# Patient Record
Sex: MALE | Race: WHITE
[De-identification: names, ages, dates, MRNs, and addresses within clinical notes are randomized per-mention and may not be internally consistent; named-entity substitution may affect disease eponyms.]

---
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
## 2020-08-20 ENCOUNTER — HOSPITAL ENCOUNTER (OUTPATIENT)
Dept: HOSPITAL 50 - VM.SDS | Age: 60
Discharge: HOME | End: 2020-08-20
Attending: FAMILY MEDICINE
Payer: MEDICAID

## 2020-08-20 VITALS — SYSTOLIC BLOOD PRESSURE: 134 MMHG | HEART RATE: 55 BPM | DIASTOLIC BLOOD PRESSURE: 70 MMHG

## 2020-08-20 DIAGNOSIS — I78.1: ICD-10-CM

## 2020-08-20 DIAGNOSIS — F17.210: ICD-10-CM

## 2020-08-20 DIAGNOSIS — R05: ICD-10-CM

## 2020-08-20 DIAGNOSIS — K29.60: ICD-10-CM

## 2020-08-20 DIAGNOSIS — E78.2: ICD-10-CM

## 2020-08-20 DIAGNOSIS — K57.30: ICD-10-CM

## 2020-08-20 DIAGNOSIS — I10: ICD-10-CM

## 2020-08-20 DIAGNOSIS — Z80.0: ICD-10-CM

## 2020-08-20 DIAGNOSIS — Z20.828: ICD-10-CM

## 2020-08-20 DIAGNOSIS — J44.9: ICD-10-CM

## 2020-08-20 DIAGNOSIS — Z79.899: ICD-10-CM

## 2020-08-20 DIAGNOSIS — K52.9: ICD-10-CM

## 2020-08-20 DIAGNOSIS — K44.9: ICD-10-CM

## 2020-08-20 DIAGNOSIS — Z01.812: ICD-10-CM

## 2020-08-20 DIAGNOSIS — K64.9: ICD-10-CM

## 2020-08-20 DIAGNOSIS — D12.6: Primary | ICD-10-CM

## 2020-08-20 PROCEDURE — 87635 SARS-COV-2 COVID-19 AMP PRB: CPT

## 2020-08-20 PROCEDURE — 45380 COLONOSCOPY AND BIOPSY: CPT

## 2020-08-20 PROCEDURE — 43239 EGD BIOPSY SINGLE/MULTIPLE: CPT

## 2020-08-20 PROCEDURE — 45385 COLONOSCOPY W/LESION REMOVAL: CPT

## 2020-08-20 PROCEDURE — U0002 COVID-19 LAB TEST NON-CDC: HCPCS

## 2020-08-21 NOTE — OR
PRE-OPERATIVE DIAGNOSES:

1. History of chronic diarrhea for the past couple of years.  He does note

    occasional blood in the stool.

2. Positive family history of colon cancer in mother when she was in her 70s.

 

POST-OPERATIVE DIAGNOSES:

1. Total of 9 polyps removed.

    a.     3 mm polyp at 85 cm, removed with cold forceps.

    b.     3 mm polyp at 80 cm, removed with cold forceps.

    c.     2 mm x2 at 75 cm, removed with cold forceps.

    d.     4 mm x2 at 60 cm, removed with hot snare.

    e.     2 mm at 25 cm, removed with cold forceps.

    f.     9 mm and 4 mm polyps at 20 cm, both removed using hot snare.

2. Minimal left-sided diverticulosis.

3. Mild hemorrhoids.

4. Normal-appearing colonic mucosa and normal-appearing distal ileum.  Random

    biopsies were taken from distal ileum and then randomly throughout the

    colon.

 

PROCEDURE:  Colonoscopy with polypectomy x9 (4 hot snares and 5 cold forceps).

Random biopsies also taken from the distal ileum and then randomly throughout

each segment of colon including the rectum.

 

SURGEON:  Hector Romero M.D.

 

ANESTHESIA:  Monitored anesthesia care.

 

BOWEL PREP:  Good.

 

DESCRIPTION OF PROCEDURE:  Everton is a 60-year-old male who was brought to the

endoscopy suite after discussing risks and benefits of the procedure.  Informed

consent was obtained for conscious sedation and colonoscopy with or without

biopsy and/or polypectomy.  We also discussed possibility of missed lesions.

Pre-procedure exam was unremarkable.  IV, oxygen, and monitors were placed.  The

patient was placed in the left lateral decubitus position.  Sedation was

administered and a digital rectal exam was performed and unremarkable except for

some mild hemorrhoids.  The colonoscope was passed in the rectum and slowly

advanced all the way to the cecum.  Cecum was viewed and photographed.

Ileocecal valve was intubated and distal ileum was normal in appearance.  Cold

biopsy x2 bites taken from the distal ileum.  Colonoscope was slowly withdrawn,

mucosa closely observed in direct circumferential manner.  Random colon biopsies

were taken from each segment of the colon given the patient's chronic diarrhea.

The patient also had numerous polyps, total of 9 as noted above.  The ascending

colon was otherwise unremarkable.  Transverse colon unremarkable.  The

descending and sigmoid colon revealed some minimal diverticulosis.  Retroflexion

was performed.  Rectal mucosa revealed some mild hemorrhoids which were mildly

inflamed.  Scope was removed.  The patient tolerated the procedure well.  The

patient was monitored until that baseline status.  Discharge instructions were

reviewed and the patient was discharged in good condition.

 

COMPLICATIONS:  None.

 

TOTAL TIME:  35 minutes.

 

ESTIMATED BLOOD LOSS:  1 to 2 mL.

 

RECOMMENDATIONS/FOLLOW-UP:  We will await results of path report to determine

ideal followup interval.

 

I would like to kindly thank Kal Luther for this referral.

 

 

DMB:  08/20/2020 14:16:34  MODL:  08/20/2020 16:09:33

Job #:  [id number]

## 2020-08-21 NOTE — OR
PRE-OPERATIVE DIAGNOSES:

1. Dysphagia for the past year.

2. Chronic diarrhea.

 

POST-OPERATIVE DIAGNOSES:

1. Moderate antritis.  Antral biopsy x2 bites taken.

2. 1 cm sliding-type hiatal hernia noted without any significant esophagitis.

3. Normal-appearing duodenum.  Cold biopsy x2 bites taken given his history of

    chronic diarrhea.

 

PROCEDURE:  Esophagogastroduodenoscopy with cold biopsy x2 sites (antrum and

duodenum).

 

SURGEON:  Hector Romero M.D.

 

ANESTHESIA:  Monitored anesthesia care.

 

DESCRIPTION OF PROCEDURE:  Everton is a 60-year-old male who was brought to the

endoscope suite after discussion of risks and benefits (including but not

limited to reaction to medication, bleeding, infection, aspiration,

perforation).  Informed consent was obtained for monitored anesthesia care and

esophagogastroduodenoscopy along with possible biopsy and/or dilatation.

 

Pre-procedure exam including oral cavity was unremarkable except for poor

dentition.  IV, oxygen, and monitors were placed.  Patient was placed in the

left lateral position and sedation was administered.  A bite block was placed

gently and scope lightly lubricated and passed through the bite block and over

the tongue.  Hypopharynx and vocal cords were visualized and unremarkable.

There does appear to be some chronic mild inflammation likely from reflux and

smoking.  Scope was passed through the cricopharynx and into the esophagus.  The

scope was then passed through the distal esophagus and the GE junction was

visualized and photographed.  The GE junction was remarkable for a small 1 cm

sliding-type hiatal hernia.  There was no evidence for any significant

esophagitis.  Vocal cords were visualized and unremarkable.  The scope was

advanced into the stomach and gastric lake was suctioned.  Pylorus was

identified and intubated and then the scope was advanced to the third portion of

the duodenum.  The second and third portions of the duodenum were unremarkable.

Cold biopsy x2 bites taken given his history of chronic diarrhea.  Duodenal bulb

was visualized and unremarkable.  The scope was brought back into the stomach.

The pylorus and the antrum revealed some moderate antritis with red streaking.

Biopsies for H. pylori and path were obtained from the antrum using cold forceps

x2 bites.  The scope was then retroflexed to visualize the angularis, fundus,

body, and cardia.  These were unremarkable.

 

The stomach was desufflated of air and then the scope was slowly withdrawn, and

the esophagus was closely visualized during withdrawal all the way into the

posterior pharynx and this was normal.  The patient tolerated the procedure well

and went to recovery in stable condition.  The patient was monitored until at

baseline status.  Findings and discharge instructions were reviewed and the

patient was discharged in good condition.

 

COMPLICATIONS:  None.

 

TOTAL TIME:  7 minutes.

 

ESTIMATED BLOOD LOSS:  About 1 mL.

 

RECOMMENDATIONS/FOLLOW-UP:  I did recommend increasing his omeprazole to 40 mg

daily.  Also recommended avoidance of NSAIDs and smoking cessation.  He would

also benefit from limiting or avoiding alcohol intake.  Prescription was done

for the omeprazole 40 mg daily.  We will send letter with path results.

 

I would like to kindly thank Kal Luther for this referral.

 

 

DMB:  08/20/2020 14:11:50  MODL:  08/20/2020 15:57:15

Job #:  [id number]

## 2020-10-08 ENCOUNTER — HOSPITAL ENCOUNTER (INPATIENT)
Dept: HOSPITAL 52 - LL.ED | Age: 60
LOS: 1 days | Discharge: HOME | DRG: 641 | End: 2020-10-09
Attending: FAMILY MEDICINE | Admitting: FAMILY MEDICINE
Payer: MEDICAID

## 2020-10-08 DIAGNOSIS — R63.4: ICD-10-CM

## 2020-10-08 DIAGNOSIS — I95.89: ICD-10-CM

## 2020-10-08 DIAGNOSIS — M19.90: ICD-10-CM

## 2020-10-08 DIAGNOSIS — D64.9: ICD-10-CM

## 2020-10-08 DIAGNOSIS — F17.210: ICD-10-CM

## 2020-10-08 DIAGNOSIS — I25.10: ICD-10-CM

## 2020-10-08 DIAGNOSIS — F32.9: ICD-10-CM

## 2020-10-08 DIAGNOSIS — Z71.6: ICD-10-CM

## 2020-10-08 DIAGNOSIS — F41.8: ICD-10-CM

## 2020-10-08 DIAGNOSIS — F41.9: ICD-10-CM

## 2020-10-08 DIAGNOSIS — R19.7: ICD-10-CM

## 2020-10-08 DIAGNOSIS — I10: ICD-10-CM

## 2020-10-08 DIAGNOSIS — I71.4: ICD-10-CM

## 2020-10-08 DIAGNOSIS — E87.1: ICD-10-CM

## 2020-10-08 DIAGNOSIS — N28.9: ICD-10-CM

## 2020-10-08 DIAGNOSIS — N17.9: ICD-10-CM

## 2020-10-08 DIAGNOSIS — G89.29: ICD-10-CM

## 2020-10-08 DIAGNOSIS — K21.9: ICD-10-CM

## 2020-10-08 DIAGNOSIS — Z79.899: ICD-10-CM

## 2020-10-08 DIAGNOSIS — E78.5: ICD-10-CM

## 2020-10-08 DIAGNOSIS — E03.9: ICD-10-CM

## 2020-10-08 DIAGNOSIS — Z98.890: ICD-10-CM

## 2020-10-08 DIAGNOSIS — I71.9: ICD-10-CM

## 2020-10-08 DIAGNOSIS — E86.0: Primary | ICD-10-CM

## 2020-10-08 DIAGNOSIS — J43.1: ICD-10-CM

## 2020-10-08 DIAGNOSIS — M54.2: ICD-10-CM

## 2020-10-08 DIAGNOSIS — M54.9: ICD-10-CM

## 2020-10-08 LAB — APTT PPP: 39.8 SEC (ref 24.5–32.8)

## 2020-10-08 PROCEDURE — C9113 INJ PANTOPRAZOLE SODIUM, VIA: HCPCS

## 2020-10-08 PROCEDURE — 85730 THROMBOPLASTIN TIME PARTIAL: CPT

## 2020-10-08 PROCEDURE — 96374 THER/PROPH/DIAG INJ IV PUSH: CPT

## 2020-10-08 PROCEDURE — 71045 X-RAY EXAM CHEST 1 VIEW: CPT

## 2020-10-08 PROCEDURE — 99285 EMERGENCY DEPT VISIT HI MDM: CPT

## 2020-10-08 PROCEDURE — 83605 ASSAY OF LACTIC ACID: CPT

## 2020-10-08 PROCEDURE — 82553 CREATINE MB FRACTION: CPT

## 2020-10-08 PROCEDURE — 83735 ASSAY OF MAGNESIUM: CPT

## 2020-10-08 PROCEDURE — 85610 PROTHROMBIN TIME: CPT

## 2020-10-08 PROCEDURE — 80307 DRUG TEST PRSMV CHEM ANLYZR: CPT

## 2020-10-08 PROCEDURE — 96361 HYDRATE IV INFUSION ADD-ON: CPT

## 2020-10-08 PROCEDURE — 84550 ASSAY OF BLOOD/URIC ACID: CPT

## 2020-10-08 PROCEDURE — 93005 ELECTROCARDIOGRAM TRACING: CPT

## 2020-10-08 PROCEDURE — 82550 ASSAY OF CK (CPK): CPT

## 2020-10-08 PROCEDURE — 83880 ASSAY OF NATRIURETIC PEPTIDE: CPT

## 2020-10-08 PROCEDURE — 84484 ASSAY OF TROPONIN QUANT: CPT

## 2020-10-08 PROCEDURE — 84443 ASSAY THYROID STIM HORMONE: CPT

## 2020-10-08 PROCEDURE — 85379 FIBRIN DEGRADATION QUANT: CPT

## 2020-10-08 NOTE — EDM.PDOC
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   


Qualifiers: 


   Hyperlipidemia type: unspecified   Qualified Code(s): E78.5 - Hyperlipidemia,

 unspecified   





(8) Hypotension


SNOMED Code(s): 57347000


   Code(s): I95.9 - HYPOTENSION, UNSPECIFIED   Status: Acute   Priority: High   

Current Visit: No   Onset Date: 10/08/20   Annotation/Comment:: Blood pressures 

improved after 1 L bolus of normal saline with symptoms likely secondary to 

dehydration from his chronic diarrhea as above/below.  Continue aggressive IV 

fluid hydration.  Note hyponatremia.  Consider 3% sodium chloride infusion with 

caution depending on his clinical course.  No vasopressors indicated at this 

time.  Note history of abdominal aortic aneurysm as above/below with no direct 

evidence of recurrence at this time despite renal insufficiency.   


Qualifiers: 


   Hypotension type: other hypotension type   Qualified Code(s): I95.89 - Other 

hypotension   





(9) Coronary artery disease


SNOMED Code(s): 57983087


   Code(s): I25.10 - ATHSCL HEART DISEASE OF NATIVE CORONARY ARTERY W/O ANG 

PCTRS   Status: Acute   Priority: High   Current Visit: No   Onset Date: 

10/08/20   Annotation/Comment:: Evidence of anterior wall cardiac ischemia by 

today's EKG with no chest pain or anginal type symptoms.  Chest pain protocol 

was not initiated secondary to absence of the symptoms.  Initiate standard rule 

out MI orders.  Cardiology consultation depending on his clinical course.  

Consider Cardiolite stress test on an outpatient basis secondary to his multiple

 cardiac risk factors.   


Qualifiers: 


   Coronary Disease-Associated Artery/Lesion type: native artery   Native vs. 

transplanted heart: native heart   Associated angina: without angina   Qualified

 Code(s): I25.10 - Atherosclerotic heart disease of native coronary artery 

without angina pectoris   





(10) Acute renal insufficiency


SNOMED Code(s): 114493748


   Code(s): N28.9 - DISORDER OF KIDNEY AND URETER, UNSPECIFIED   Status: Acute  

 Priority: High   Current Visit: No   Onset Date: 10/08/20   

Annotation/Comment:: As above.  Likely secondary to dehydration.  Continue 

aggressive IV fluids as above.   





(11) Hyponatremia


SNOMED Code(s): 23793684


   Code(s): E87.1 - HYPO-OSMOLALITY AND HYPONATREMIA   Status: Acute   Priority:

 High   Current Visit: No   Onset Date: 10/08/20   Annotation/Comment:: As above

   





(12) Hypothyroidism (acquired)


SNOMED Code(s): 899346750


   Code(s): E03.9 - HYPOTHYROIDISM, UNSPECIFIED   Status: Acute   Priority: 

Medium   Current Visit: No   Onset Date: 10/08/20   Annotation/Comment:: Newly 

diagnosed.  Initiate low-dose Synthroid therapy.   





(13) Diarrhea


SNOMED Code(s): 66868406


   Code(s): R19.7 - DIARRHEA, UNSPECIFIED   Status: Chronic   Priority: High   

Current Visit: Yes   Annotation/Comment:: Note recent EGD and colonoscopy in 

2020 as above.  Stool specimens to be collected as above.  In addition, 

note unintentional weight loss secondary to this diarrhea in the last several 

weeks.   


Qualifiers: 


   Diarrhea type: unspecified type   Qualified Code(s): R19.7 - Diarrhea, unsp

ecified   





(14) Mixed anxiety depressive disorder


SNOMED Code(s): 346914957


   Code(s): F41.8 - OTHER SPECIFIED ANXIETY DISORDERS   Status: Chronic   

Priority: Medium   Current Visit: Yes   Annotation/Comment:: Stable by history. 

 No history of consistent alcohol abuse.  DT precautions to be initiated.   





- Problem List Review


Problem List Initiated/Reviewed/Updated: Yes





- My Orders


Last 24 Hours: 


My Active Orders





10/08/20 Breakfast


Nothing per Oral Now Diet [DIET] 





10/08/20 12:51


Cardiac Monitoring [RC] .AS DIRECTED 


EKG Documentation Completion [RC] ASDIRECTED 


Oxygen Therapy, ED [RC] CONTINUOUS 


Peripheral IV Care [RC] .AS DIRECTED 


Pulse Oximetry [RC] CONTINUOUS 


Up With Assistance [RC] PFP 


Vital Signs [RC] PFP 


Chest 1V Frontal [CR] Stat 


Sodium Chloride 0.9% [Saline Flush]   10 ml FLUSH ASDIRECTED PRN 


Obtain Past Medical Record [OM.PC] Urgent 


Peripheral IV Insertion Adult [OM.PC] Stat 


Resuscitation Status Stat 














- Assessment/Plan


Admission H&P: Please use this note as an admission H&P


Last 24 Hours: 


My Active Orders





10/08/20 Breakfast


Nothing per Oral Now Diet [DIET] 





10/08/20 12:51


Cardiac Monitoring [RC] .AS DIRECTED 


EKG Documentation Completion [RC] ASDIRECTED 


Oxygen Therapy, ED [RC] CONTINUOUS 


Peripheral IV Care [RC] .AS DIRECTED 


Pulse Oximetry [RC] CONTINUOUS 


Up With Assistance [RC] PFP 


Vital Signs [RC] PFP 


Chest 1V Frontal [CR] Stat 


Sodium Chloride 0.9% [Saline Flush]   10 ml FLUSH ASDIRECTED PRN 


Obtain Past Medical Record [OM.PC] Urgent 


Peripheral IV Insertion Adult [OM.PC] Stat 


Resuscitation Status Stat 











Assessment:: 





As above


Plan: 





As above.  Extensive precautions were given to the patient, who is in agreement 

with the treatment plan.  The patient will require about 3-4 days of 

inpatient/acute care secondary to multiple health problems as above.

## 2020-10-09 VITALS — HEART RATE: 78 BPM | SYSTOLIC BLOOD PRESSURE: 100 MMHG | DIASTOLIC BLOOD PRESSURE: 58 MMHG

## 2020-10-09 LAB
CHLORIDE SERPL-SCNC: 104 MMOL/L (ref 98–107)
HBA1C MFR BLD: 5.6 % (ref 4.3–5.7)
SODIUM SERPL-SCNC: 133 MMOL/L (ref 136–145)

## 2020-10-09 NOTE — [id number]
**Discharge Summary





- Hospital Course


Brief History: Patient admitted for dehydration secondary to prolonged history 

of loose stools.


Diagnosis: Stroke: No





- Discharge Data


Discharge Date: 10/09/20


Discharge Disposition: Home, Self-Care 01


Condition: Good





- Referral to Home Health


Primary Care Physician: 


PCP Unknown








- Discharge Diagnosis/Problem(s)


(1) Diarrhea


SNOMED Code(s): 24173013


   ICD Code: R19.7 - DIARRHEA, UNSPECIFIED   Status: Chronic   Priority: High   

Current Visit: Yes   Problem Details: Patient reports significant improvement of

loose stools since admission/IV fluids. Note recent EGD and colonoscopy in 

August 2020 as above.  Stool specimens requested at time of admission.  In 

addition, note unintentional weight loss secondary to this diarrhea in the last 

several weeks.  Patient does not want to remain in hospital and wishes to be 

discharged today.  Says he will continue follow up with his primary providers/GI

for ongoing workup.    


Qualifiers: 


   Diarrhea type: unspecified type   Qualified Code(s): R19.7 - Diarrhea, 

unspecified   





(2) Acute renal insufficiency


SNOMED Code(s): 728514882


   ICD Code: N28.9 - DISORDER OF KIDNEY AND URETER, UNSPECIFIED   Status: Acute 

 Priority: High   Current Visit: No   Onset Date: 10/08/20   Problem Details:  

Likely secondary to dehydration.  Improved today with Cr of 1.69


As noted above, patient does not wish to stay in the hospital and wishes to be 

discharged. Nursing noted that he has had good PO fluid intake today. Recommend 

follow up next week with primary to get BMP rechecked.    





(3) Hypothyroidism (acquired)


SNOMED Code(s): 716778053


   ICD Code: E03.9 - HYPOTHYROIDISM, UNSPECIFIED   Status: Acute   Priority: 

Medium   Current Visit: No   Onset Date: 10/08/20   Problem Details: Newly 

diagnosed.  Follow up with PCP for initiation of Thyroid medication, ongoing 

monitoring and adjustments.    





(4) Mixed anxiety depressive disorder


SNOMED Code(s): 138351976


   ICD Code: F41.8 - OTHER SPECIFIED ANXIETY DISORDERS   Status: Chronic   

Priority: Medium   Current Visit: Yes   Problem Details: Stable by history.  No 

history of consistent alcohol abuse.  No DTs observed by nursing staff.    





(5) Aortic aneurysm


SNOMED Code(s): 11848767


   ICD Code: I71.9 - AORTIC ANEURYSM OF UNSPECIFIED SITE, WITHOUT RUPTURE   

Status: Acute   Priority: High   Current Visit: No   Problem Details: Note 

previous history of abdominal aortic aneurysm, including stent placement, with 

apparent previous close followup by his regular physicians at the [facility]

in [place], although the patient has apparently been noncompliant with these visit

recently.  Despite renal insufficiency today no direct evidence of recurrence of

abdominal aortic aneurysm.     





(6) Coronary artery disease


SNOMED Code(s): 24407445


   ICD Code: I25.10 - ATHSCL HEART DISEASE OF NATIVE CORONARY ARTERY W/O ANG 

PCTRS   Status: Acute   Priority: High   Current Visit: No   Onset Date: 

10/08/20   Problem Details: Evidence of anterior wall cardiac ischemia by 

today's EKG with no chest pain or anginal type symptoms.  Chest pain protocol 

was not initiated secondary to absence of the symptoms. Troponins negative.  

Cardiolite stress test on an outpatient basis secondary to his multiple cardiac 

risk factors recommended.   


Qualifiers: 


   Coronary Disease-Associated Artery/Lesion type: native artery   Native vs. 

transplanted heart: native heart   Associated angina: without angina   Qualified

Code(s): I25.10 - Atherosclerotic heart disease of native coronary artery 

without angina pectoris   





(7) HTN, Benign hypertension


SNOMED Code(s): 70200761


   ICD Code: I10 - ESSENTIAL (PRIMARY) HYPERTENSION   Status: Acute   Priority: 

Medium   Current Visit: No   Onset Date: 12/03/14   Problem Details: Note 

history of hypertension with severe hypotension at time of presentation likely 

secondary to dehydration as above.  BPs improved s/p IV fluids and are stable.  

 





(8) Hyponatremia


SNOMED Code(s): 41584738


   ICD Code: E87.1 - HYPO-OSMOLALITY AND HYPONATREMIA   Status: Acute   

Priority: High   Current Visit: No   Onset Date: 10/08/20   Problem Details: 

Level improved to 133 today.    





(9) Hypotension


SNOMED Code(s): 08384724


   ICD Code: I95.9 - HYPOTENSION, UNSPECIFIED   Status: Acute   Priority: High  

Current Visit: No   Onset Date: 10/08/20   Problem Details: Blood pressures 

improved after 1 L bolus of normal saline with symptoms likely secondary to 

dehydration from his chronic diarrhea as above/below.   No vasopressors 

indicated.  Note history of abdominal aortic aneurysm as above/below with no 

direct evidence of recurrence at this time despite renal insufficiency.   


Qualifiers: 


   Hypotension type: other hypotension type   Qualified Code(s): I95.89 - Other 

hypotension   





(10) COPD (chronic obstructive pulmonary disease)


SNOMED Code(s): 45213808


   ICD Code: J44.9 - CHRONIC OBSTRUCTIVE PULMONARY DISEASE, UNSPECIFIED   

Status: Chronic   Priority: Medium   Current Visit: No   Problem Details: P

ersistent tobacco abuse history with tobacco cessation once again strongly 

encouraged and cessation information to be provided once again at time of 

discharge.  None strongly consider PFTs in this patient with initiation of 

Combivent inhaler during this hospitalization. No recent fever or bronchitic 

type symptoms.  No indication for antibiotic therapy at this time.   


Qualifiers: 


   COPD type: emphysema   Emphysema type: panlobular   Qualified Code(s): J43.1 

- Panlobular emphysema   





(11) Hyperlipidemia


SNOMED Code(s): 06325388


   ICD Code: E78.5 - HYPERLIPIDEMIA, UNSPECIFIED   Status: Chronic   Priority: 

Medium   Current Visit: No   Problem Details: Currently under therapy   


Qualifiers: 


   Hyperlipidemia type: unspecified   Qualified Code(s): E78.5 - Hyperlipidemia,

unspecified   





(12) Osteoarthritis


SNOMED Code(s): 257461463


   ICD Code: M19.90 - UNSPECIFIED OSTEOARTHRITIS, UNSPECIFIED SITE   Status: 

Chronic   Priority: Medium   Current Visit: No   Problem Details: Otherwise 

stable by patient history with no recent history of fall or injury.   





(13) Peptic reflux disease


SNOMED Code(s): 932363408


   ICD Code: K21.9 - GASTRO-ESOPHAGEAL REFLUX DISEASE WITHOUT ESOPHAGITIS   

Status: Chronic   Priority: Medium   Current Visit: No   Problem Details: Note 

recent EGD and colonoscopy as above.  High-dose IV Pepcid given in the emergency

room with additional IV Protonix to be initiated after admission.  No direct 

evidence of acute GI bleed despite his chronic diarrhea, which was present prior

to his GI work-up in August.  Stool specimens to be obtained for C. difficile, 

culture, ova, and parasites.   





(14) Tobacco abuse counseling


SNOMED Code(s): 820567595, 789848258, 168921532


   ICD Code: Z71.6 - TOBACCO ABUSE COUNSELING   Status: Chronic   Priority: 

Medium   Current Visit: No   Problem Details: Tobacco cessation once again 

strongly encouraged as above with tobacco cessation information to be provided 

at discharge.   





- Patient Summary/Data


Hospital Course: 





Patient hydrated with IV fluids.  BP improved.  He reported feeling much better.

Tolerating PO fluids well.  Reports decreased loose stools. Renal function 

improved.  DDimer elevated.  No SOB/evidence of DVTs on exam.  Unable to perform

CT scan due to elevated renal function.  US not available until Monday.  


Normal WBC/afebrile.  Patient does not want to remain in hospital any longer w

hen rounded upon and wishes to be discharged. Precautions reviewed with patient.

 Strongly encouraged to follow up at clinic next week for recheck of labs/kidney

function. Will need to continue follow up with the chronic diarrhea, newly 

diagnosed hypothyroidism.  To return to ER if he has sudden SOB/PE concerns.  





- Patient Instructions


Diet: Usual Diet as Tolerated


Activity: As Tolerated


Showering/Bathing: May Shower


Other/Special Instructions: Follow up at clinic NEXT WEEK so that they can check

to see if your kidneys are better vs worse.  You also need to discuss starting 

Thyroid medication as your Thyroid TSH is high and will need a better lab 

workup.  Return to the ER if you have any shortness of breath.  Recommend that 

you have an ultrasound of the lower legs next week to make certain there are no 

signs of any blood clots because one of your lab tests, called a GEOFFimer, was 

high. Continue follow up with GI and your regular doc for the diarrhea.  Stay 

hydrated.  Follow up otherwise as needed in ER if you have worsening problems.





- Discharge Plan


*PRESCRIPTION DRUG MONITORING PROGRAM REVIEWED*: Not Applicable


*COPY OF PRESCRIPTION DRUG MONITORING REPORT IN PATIENT MAYUR: Not Applicable


Home Medications: 


                                    Home Meds





Losartan [Cozaar] 100 mg PO DAILY 12/03/14 [History]


Omeprazole [Prilosec] 40 mg PO DAILY 12/03/14 [History]


Metoprolol Tartrate 25 mg PO BID 12/08/14 [History]


Cyclobenzaprine [Flexeril] 10 mg PO BEDTIME 08/12/20 [History]


Fenofibrate Nanocrystallized [Tricor] 145 mg PO DAILY 08/12/20 [History]


Rosuvastatin Calcium [Crestor] 40 mg PO DAILY 08/12/20 [History]


Gabapentin [Neurontin] 300 mg PO BEDTIME 10/08/20 [History]








Patient Handouts:  Acute Kidney Injury, Adult, Hypothyroidism


Forms:  ED Department Discharge


Referrals: 


PCP,Unknown [Primary Care Provider] - 





- Discharge Summary/Plan Comment


DC Time >30 min.: Yes (waiting for ride)





- General Info


Date of Service: 10/09/20


Admission Dx/Problem (Free Text: 





Dehydration/hypotension/chronic diarrhea


Subjective Update: 





Patient is without complaint.  Says he feels fine and wants to go home.  Is 

urinating.  Decreased loose stools. 


Functional Status: Reports: Pain Controlled


Numeric/FACES Score: 0





- Review of Systems


General: Reports: No Symptoms


HEENT: Denies: Headaches, Sinus Congestion, Sore Throat, Rhinitis, Visual 

Changes


Pulmonary: Reports: Cough (chronic/unchanged).  Denies: Shortness of Breath, 

Pleuritic Chest Pain, Sputum, Hemoptysis, Wheezing


Cardiovascular: Denies: Chest Pain, Palpitations, Dyspnea on Exertion, 

Orthopnea, Lightheadedness


Gastrointestinal: Reports: Diarrhea (chronic/improved).  Denies: Abdominal Pain,

Hematochezia, Melena, Nausea, Vomiting


Genitourinary: Reports: No Symptoms


Musculoskeletal: Reports: Other (no changes from baseline)


Skin: Reports: No Symptoms


Neurological: Reports: No Symptoms


Psychiatric: Reports: No Symptoms





- Patient Data


Vitals - Most Recent: 


                                Last Vital Signs











Temp  36.7 C   10/09/20 12:00


 


Pulse  68   10/09/20 12:00


 


Resp  18   10/09/20 12:00


 


BP  102/58 L  10/09/20 12:00


 


Pulse Ox  98   10/09/20 12:00











Weight - Most Recent: 71.169 kg


I&O - Last 24 hours: 


                                 Intake & Output











 10/09/20 10/09/20 10/09/20





 06:59 14:59 22:59


 


Intake Total 1150 1058 


 


Output Total 400  


 


Balance 750 1058 











Lab Results - Last 24 hrs: 


                         Laboratory Results - last 24 hr











  10/08/20 10/09/20 10/09/20 Range/Units





  20:50 07:30 07:30 


 


WBC   6.0   (4.0-10.2)  K/uL


 


RBC   4.28 L   (4.33-5.41)  M/uL


 


Hgb   13.2  D   (13.1-16.8)  g/dL


 


Hct   39.8   (39.0-49.0)  %


 


MCV   93.0   (84.0-98.0)  fL


 


MCH   30.8   (28.2-33.3)  pg


 


MCHC   33.2   (31.7-36.0)  g/dL


 


RDW   13.4   (11.2-14.1)  %


 


Plt Count   157   (150-350)  K/uL


 


Neut % (Auto)   45.5   (45.0-80.0)  %


 


Lymph % (Auto)   36.6   (10.0-50.0)  %


 


Mono % (Auto)   12.7   (2.0-14.0)  %


 


Eos % (Auto)   4.0   (0.0-5.0)  %


 


Baso % (Auto)   1.2   (0.0-2.0)  %


 


Neut # (Auto)   2.72   (1.40-7.00)  K/uL


 


Lymph # (Auto)   2.19   (0.50-3.50)  K/uL


 


Mono # (Auto)   0.76   (0.00-1.00)  K/uL


 


Eos # (Auto)   0.24   (0.00-0.50)  K/uL


 


Baso # (Auto)   0.07   (0.00-0.20)  K/uL


 


Sodium    133 L  (136-145)  mmol/L


 


Potassium    4.4  (3.5-5.1)  mmol/L


 


Chloride    104  ()  mmol/L


 


Carbon Dioxide    18.5 L  (21.0-32.0)  mmol/L


 


BUN    47 H  (7-18)  mg/dL


 


Creatinine    1.69 H  (0.51-1.17)  mg/dL


 


Est Cr Clr Drug Dosing    TNP  


 


Estimated GFR (MDRD)    41  mL/min


 


Glucose    86  ()  mg/dL


 


Hemoglobin A1c     (4.3-5.7)  %


 


Calcium    8.2 L  (8.5-10.1)  mg/dL


 


Total Bilirubin    0.7  (0.2-1.0)  mg/dL


 


AST    21  (15-37)  U/L


 


ALT    18  (12-78)  U/L


 


Alkaline Phosphatase    25 L  ()  IU/L


 


Creatine Kinase  52   47  ()  U/L


 


Creatine Kinase Index  1.3   1.5  (0.0-2.5)  %


 


CK-MB (CK-2)  0.70   0.70  (0.00-3.60)  ng/mL


 


Troponin I  0.000   0.008  (0.000-0.056)  ng/mL


 


Total Protein    6.0 L  (6.4-8.2)  g/dL


 


Albumin    2.7 L  (3.4-5.0)  g/dL


 


Triglycerides    132  ()  mg/dL


 


Cholesterol    89 L  (100-200)  mg/dL


 


LDL Cholesterol, Calc    41  (0-100)  mg/dL


 


HDL Cholesterol    22 L  (40-60)  mg/dL














  10/09/20 Range/Units





  07:30 


 


WBC   (4.0-10.2)  K/uL


 


RBC   (4.33-5.41)  M/uL


 


Hgb   (13.1-16.8)  g/dL


 


Hct   (39.0-49.0)  %


 


MCV   (84.0-98.0)  fL


 


MCH   (28.2-33.3)  pg


 


MCHC   (31.7-36.0)  g/dL


 


RDW   (11.2-14.1)  %


 


Plt Count   (150-350)  K/uL


 


Neut % (Auto)   (45.0-80.0)  %


 


Lymph % (Auto)   (10.0-50.0)  %


 


Mono % (Auto)   (2.0-14.0)  %


 


Eos % (Auto)   (0.0-5.0)  %


 


Baso % (Auto)   (0.0-2.0)  %


 


Neut # (Auto)   (1.40-7.00)  K/uL


 


Lymph # (Auto)   (0.50-3.50)  K/uL


 


Mono # (Auto)   (0.00-1.00)  K/uL


 


Eos # (Auto)   (0.00-0.50)  K/uL


 


Baso # (Auto)   (0.00-0.20)  K/uL


 


Sodium   (136-145)  mmol/L


 


Potassium   (3.5-5.1)  mmol/L


 


Chloride   ()  mmol/L


 


Carbon Dioxide   (21.0-32.0)  mmol/L


 


BUN   (7-18)  mg/dL


 


Creatinine   (0.51-1.17)  mg/dL


 


Est Cr Clr Drug Dosing   


 


Estimated GFR (MDRD)   mL/min


 


Glucose   ()  mg/dL


 


Hemoglobin A1c  5.6  (4.3-5.7)  %


 


Calcium   (8.5-10.1)  mg/dL


 


Total Bilirubin   (0.2-1.0)  mg/dL


 


AST   (15-37)  U/L


 


ALT   (12-78)  U/L


 


Alkaline Phosphatase   ()  IU/L


 


Creatine Kinase   ()  U/L


 


Creatine Kinase Index   (0.0-2.5)  %


 


CK-MB (CK-2)   (0.00-3.60)  ng/mL


 


Troponin I   (0.000-0.056)  ng/mL


 


Total Protein   (6.4-8.2)  g/dL


 


Albumin   (3.4-5.0)  g/dL


 


Triglycerides   ()  mg/dL


 


Cholesterol   (100-200)  mg/dL


 


LDL Cholesterol, Calc   (0-100)  mg/dL


 


HDL Cholesterol   (40-60)  mg/dL











Med Orders - Current: 


                               Current Medications





Acetaminophen (Tylenol)  650 mg PO Q4H PRN


   PRN Reason: Pain


Enoxaparin Sodium (Lovenox)  70 mg SUBCUT Q24H Counts include 234 beds at the [facility]


   Last Admin: 10/08/20 18:02 Dose:  70 mg


   Documented by: 


Fenofibrate (Fenofibrate)  134 mg PO DAILY Counts include 234 beds at the [facility]


   Last Admin: 10/09/20 08:33 Dose:  134 mg


   Documented by: 


Gabapentin (Neurontin)  300 mg PO BEDTIME Counts include 234 beds at the [facility]


   Last Admin: 10/08/20 19:54 Dose:  300 mg


   Documented by: 


Sodium Chloride (Normal Saline)  1,000 mls @ 125 mls/hr IV ASDIRECTED Counts include 234 beds at the [facility]


   Last Admin: 10/09/20 09:47 Dose:  125 mls/hr


   Documented by: 


Miscellaneous Information (Remove Patch)  1 ea TRDERM BEDTIME Counts include 234 beds at the [facility]


Nicotine (Habitrol)  21 mg TRDERM DAILY Counts include 234 beds at the [facility]


   Last Admin: 10/09/20 08:33 Dose:  21 mg


   Documented by: 


Pantoprazole Sodium (Protonix Iv***)  40 mg IVPUSH Q12HR Counts include 234 beds at the [facility]


   Last Admin: 10/09/20 08:33 Dose:  40 mg


   Documented by: 


Rosuvastatin Calcium (Crestor)  40 mg PO DAILY Counts include 234 beds at the [facility]


   Last Admin: 10/09/20 08:33 Dose:  40 mg


   Documented by: 


Sodium Chloride (Saline Flush)  10 ml FLUSH ASDIRECTED PRN


   PRN Reason: Keep Vein Open


Sodium Chloride (Saline Flush)  10 ml FLUSH Q12HR PRN


   PRN Reason: Keep Vein Open


Temazepam (Restoril)  15 mg PO BEDTIME PRN


   PRN Reason: Insomnia





Discontinued Medications





Famotidine (Pepcid)  40 mg IVPUSH ONETIME ONE


   Stop: 10/08/20 12:52


   Last Admin: 10/08/20 13:45 Dose:  40 mg


   Documented by: 


Lactated Ringer's (Ringers, Lactated)  1,000 mls @ 999 mls/hr IV .BOLUS ONE


   Stop: 10/08/20 13:53


   Last Admin: 10/08/20 13:45 Dose:  Not Given


   Documented by: 


Sodium Chloride (Normal Saline)  1,000 mls @ 999 mls/hr IV .BOLUS ONE


   Stop: 10/08/20 14:22


   Last Admin: 10/08/20 13:39 Dose:  999 mls/hr


   Documented by: 


Pantoprazole Sodium (Protonix Iv***)  40 mg IVPUSH Q12H SRAVANI


   Last Admin: 10/08/20 17:16 Dose:  40 mg


   Documented by: 











- Exam


General: Reports: Alert, Oriented, Cooperative, No Acute Distress


HEENT: Reports: Pupils Equal, Pupils Reactive, EOMI, Mucous Membr. Moist/Pink


Neck: Reports: Supple


Lungs: Reports: Clear to Auscultation, Normal Respiratory Effort


Cardiovascular: Reports: Regular Rate, Regular Rhythm


GI/Abdominal Exam: Normal Bowel Sounds, Soft, Non-Tender, No Distention


 (Male) Exam: Deferred


Rectal (Males) Exam: Deferred


Back Exam: Denies: CVA Tenderness (L), CVA Tenderness (R), Muscle Spasm


Extremities: Non-Tender, No Pedal Edema, Normal Capillary Refill


Skin: Reports: Warm, Dry


Neurological: Reports: No New Focal Deficit


Psy/Mental Status: Reports: Alert, Normal Affect, Normal Mood





EKG INTERPRETATION


EKG Date: 10/09/20


Time: 08:09


Rhythm: Other (Sinus Memo)


Rate (Beats/Min): 55


Axis: Normal


P-Wave: Present


QRS: Normal


ST-T: Other (No obvious acute ischemia noted/nonspecific t wave changed noted 

V1-V3/AVF)


Comparison: No Change